# Patient Record
Sex: FEMALE | Race: OTHER | Employment: FULL TIME | URBAN - METROPOLITAN AREA
[De-identification: names, ages, dates, MRNs, and addresses within clinical notes are randomized per-mention and may not be internally consistent; named-entity substitution may affect disease eponyms.]

---

## 2017-06-20 ENCOUNTER — HOSPITAL ENCOUNTER (OUTPATIENT)
Dept: MAMMOGRAPHY | Age: 52
Discharge: HOME | End: 2017-06-20
Attending: INTERNAL MEDICINE
Payer: OTHER GOVERNMENT

## 2017-06-20 DIAGNOSIS — Z12.31 ENCOUNTER FOR SCREENING MAMMOGRAM FOR BREAST CANCER: ICD-10-CM

## 2017-06-20 PROCEDURE — 77063 BREAST TOMOSYNTHESIS BI: CPT

## 2018-07-19 ENCOUNTER — HOSPITAL ENCOUNTER (OUTPATIENT)
Dept: MAMMOGRAPHY | Age: 53
Discharge: HOME | End: 2018-07-19
Attending: INTERNAL MEDICINE
Payer: OTHER GOVERNMENT

## 2018-07-19 DIAGNOSIS — Z12.31 VISIT FOR SCREENING MAMMOGRAM: ICD-10-CM

## 2018-07-19 PROCEDURE — 77063 BREAST TOMOSYNTHESIS BI: CPT

## 2019-08-06 ENCOUNTER — HOSPITAL ENCOUNTER (OUTPATIENT)
Dept: MAMMOGRAPHY | Age: 54
Discharge: HOME | End: 2019-08-06
Attending: INTERNAL MEDICINE
Payer: OTHER GOVERNMENT

## 2019-08-06 DIAGNOSIS — Z12.31 VISIT FOR SCREENING MAMMOGRAM: ICD-10-CM

## 2019-08-06 PROCEDURE — 77063 BREAST TOMOSYNTHESIS BI: CPT

## 2019-08-12 ENCOUNTER — HOSPITAL ENCOUNTER (OUTPATIENT)
Dept: PHYSICAL THERAPY | Age: 54
Discharge: HOME | End: 2019-08-12
Payer: OTHER GOVERNMENT

## 2019-08-12 PROCEDURE — 97535 SELF CARE MNGMENT TRAINING: CPT

## 2019-08-12 PROCEDURE — 97140 MANUAL THERAPY 1/> REGIONS: CPT

## 2019-08-12 PROCEDURE — 97162 PT EVAL MOD COMPLEX 30 MIN: CPT

## 2019-08-12 NOTE — PROGRESS NOTES
[address] [place] [address], [address] [facility] - Phone: [phone]  Fax: [phone] / [address] [facility]  Patient Name: Lizzette Ring : 1965   Medical   Diagnosis: Low back pain [M54.5] Treatment Diagnosis: Low back pain [M54.5]   Onset Date:      Referral Source: Stefanie Chand MD Start of Care [facility]): 2019   Prior Hospitalization: See medical history Provider #: [id number]   Prior Level of Function: Chronic Hx of LBP   Comorbidities: Thyroid Problem   Medications: Verified on Patient Summary List   The Plan of Care and following information is based on the information from the initial evaluation.   ===========================================================================================  Assessment / key information:  Lizzette Ring is a 49 y.o.  yo female with Dx of Low back pain [M54.5]. She reports insidious onset of low back pain in . She currently rates her pain as 10/10 at worst, 2/10 at best, primarily located at L lower lumbar region. She complains of difficulty and increase pain with rotational movement. Objective Findings: Lumbar AROM: Flx = WNL, Ext = WNL, R Rot = limited by 20%, L Rot = WNL. Manual Muscle Testing:   L hip abd and ext are Reduced. Lumbar/SI screen:  L iliosacral ant innominate, L4-5 L ERS, L on R SI innominate noted. Palpation:  Decreased mobility noted at T4-6 on L.  Pt instructed in HEP and will f/u in clinic for PT.  ===========================================================================================  Eval Complexity: History MEDIUM  Complexity : 1-2 comorbidities / personal factors will impact the outcome/ POC ;  Examination  MEDIUM Complexity : 3 Standardized tests and measures addressing body structure, function, activity limitation and / or participation in recreation ; Presentation MEDIUM Complexity : Evolving with changing characteristics ; Decision Making MEDIUM Complexity : FOTO score of 26-74; Overall Complexity MEDIUM  Problem List: pain affecting function, decrease ROM, decrease strength, decrease ADL/ functional abilitiies, decrease activity tolerance and decrease flexibility/ joint mobility   Treatment Plan may include any combination of the following: Therapeutic exercise, Therapeutic activities, Neuromuscular re-education, Physical agent/modality, Manual therapy, Patient education, Self Care training and Functional mobility training  Patient / Family readiness to learn indicated by: asking questions  Persons(s) to be included in education: patient (P)  Barriers to Learning/Limitations: None  Measures taken, if barriers to learning:    Patient Goal (s): Decrease pain    Patient self reported health status: good  Rehabilitation Potential: good   Short Term Goals: To be accomplished in  1-2  weeks:  1. Independent with HEP. 2. Decrease max pain 25-50% to assist with ADLs   Long Term Goals: To be accomplished in  3-4  weeks:  1. Decrease max pain 50-75% to assist with ADLs  2. Increase FOTO score to 70% to show functional improvment. 3.  Will rate >/= +5 on Global Rating of Change and be prepared to DC to HEP. Frequency / Duration:   Patient to be seen  2-3  times per week for 3-4  weeks:  Patient / Caregiver education and instruction: self care and exercises    Therapist Signature: Tony Darling, JAIRO, OCS, SCS, CSCS Date: 8/84/7848   Certification Period: na Time: 12:24 PM   ===========================================================================================  I certify that the above Physical Therapy Services are being furnished while the patient is under my care. I agree with the treatment plan and certify that this therapy is necessary. Physician Signature:        Date:       Time:     Please sign and return to [facility] or you may fax the signed copy to [phone].   Thank you.

## 2019-08-13 NOTE — PROGRESS NOTES
PHYSICAL THERAPY - DAILY TREATMENT NOTE    Patient Name: Cecile José        Date: 2019  : 1965   YES Patient  Verified  Visit #:     Insurance: Payor: Les Blanchard / Plan: [employer] / Product Type: Commerical /      In time: 11:30 Out time: 12:10   Total Treatment Time: 40     Medicare Time Tracking (below)   Total Timed Codes (min):  na 1:1 Treatment Time:  na     TREATMENT AREA =  Chronic left-sided low back pain [M54.5, G89.29]    SUBJECTIVE  Pain Level (on 0 to 10 scale):    Medication Changes/New allergies or changes in medical history, any new surgeries or procedures? NO    If yes, update Summary List   Subjective Functional Status/Changes:  []  No changes reported     SEE IE          OBJECTIVE    12 min Manual Therapy: Shot Gun tech, L4-5 L ERS, L on R SI ayaz   Rationale:      decrease pain, increase ROM and increase tissue extensibility to improve patient's ability to perform ADLs    8 min Self Care: T/S Rot, bridges   Rationale:    increase ROM, increase strength and improve coordination to improve the patients ability to perform ADLs      Billed With/As:   [] TE   [] TA   [] Neuro   [x] Self Care Patient Education: [x] Review HEP    [] Progressed/Changed HEP based on:   [] positioning   [] body mechanics   [] transfers   [] heat/ice application    [] other:       min Patient Education:  YES  Reviewed HEP   []  Progressed/Changed HEP based on:         Other Objective/Functional Measures:    SEE IE     Post Treatment Pain Level (on 0 to 10) scale:       ASSESSMENT  Assessment/Changes in Function:     SEE IE     []  See Progress Note/Recertification   Patient will continue to benefit from skilled PT services to modify and progress therapeutic interventions, address functional mobility deficits, address ROM deficits, address strength deficits, analyze and address soft tissue restrictions, analyze and cue movement patterns, analyze and modify body mechanics/ergonomics and assess and modify postural abnormalities to attain remaining goals.    Progress toward goals / Updated goals:         PLAN  []  Upgrade activities as tolerated YES Continue plan of care   []  Discharge due to :    []  Other:      Therapist: Becka De La Fuente, PT, OCS, SCS, CSCS    Date: 8/13/2019 Time: 9:52 AM       Future Appointments   Date Time Provider Selvin Leo   8/15/2019  8:30 AM Anish Mckeon, PT [facility]   8/19/2019 10:00 AM Anish Mckeon, PT [facility]   8/22/2019  3:30 PM Rocio Byrnes PT [facility]   8/27/2019  4:00 PM Anish Mckeon, PT [facility]   8/29/2019  4:30 PM Rocio Byrnes, PT [facility]   8/11/2020  7:45 AM [facility] JUAN STEREO BX RM 1 [facility]

## 2019-08-15 ENCOUNTER — HOSPITAL ENCOUNTER (OUTPATIENT)
Dept: PHYSICAL THERAPY | Age: 54
Discharge: HOME | End: 2019-08-15
Payer: OTHER GOVERNMENT

## 2019-08-15 PROCEDURE — 97140 MANUAL THERAPY 1/> REGIONS: CPT

## 2019-08-15 PROCEDURE — 97110 THERAPEUTIC EXERCISES: CPT

## 2019-08-15 NOTE — PROGRESS NOTES
PHYSICAL THERAPY - DAILY TREATMENT NOTE    Patient Name: Govind Samples        Date: 8/15/2019  : 1965   yes Patient  Verified  Visit #:   2   of   12  Insurance: Payor: Alexander Casey / Plan: Darian Huitron / Product Type: Commerical /      In time: 8:35 Out time: 9:35   Total Treatment Time: 60     Medicare/[facility] Time Tracking (below)   Total Timed Codes (min):  na 1:1 Treatment Time:  na     TREATMENT AREA =  Chronic left-sided low back pain [M54.5, G89.29]  SUBJECTIVE  Pain Level (on 0 to 10 scale):  0  / 10   Medication Changes/New allergies or changes in medical history, any new surgeries or procedures?    no  If yes, update Summary List   Subjective Functional Status/Changes:  []  No changes reported     Felt much better after the last session          OBJECTIVE    25 min Therapeutic Exercise:  [x]  See flow sheet   Rationale:      increase ROM, increase strength and improve coordination to improve the patients ability to perform ADLs     35 min Manual Therapy: Shot Gun tech, L4-5 L ERS, t/s mob   Rationale:      decrease pain, increase ROM and increase tissue extensibility to improve patient's ability to perform ADLs     Billed With/As:   [] TE   [] TA   [] Neuro   [] Self Care Patient Education: [x] Review HEP    [] Progressed/Changed HEP based on:   [] positioning   [] body mechanics   [] transfers   [] heat/ice application    [] other:      Other Objective/Functional Measures:    Cont to be limited at mid thoracic mobility     Post Treatment Pain Level (on 0 to 10) scale:   0  / 10     ASSESSMENT  Assessment/Changes in Function:     Difficulty with DNS star on L      []  See Progress Note/Recertification   Patient will continue to benefit from skilled PT services to modify and progress therapeutic interventions, address functional mobility deficits and address ROM deficits to attain remaining goals. Progress toward goals / Updated goals:     Independent with HEP     PLAN  [x]  Upgrade activities as tolerated yes Continue plan of care   []  Discharge due to :    []  Other:      Therapist: Piyush Prince PT    Date: 8/15/2019 Time: 6:26 AM     Future Appointments   Date Time Provider Selvin Leo   8/15/2019  8:30 AM Alex Palacio, PT [facility]   8/19/2019 10:00 AM Alex Palacio, PT [facility]   8/22/2019  3:30 PM Judith Gramajo PT [facility]   8/27/2019  4:00 PM Alex Palacio, PT [facility]   8/29/2019  4:30 PM Judith Gramajo PT [facility]   8/11/2020  7:45 AM [facility] JUAN STEREO BX RM 1 [facility]

## 2019-08-19 ENCOUNTER — HOSPITAL ENCOUNTER (OUTPATIENT)
Dept: PHYSICAL THERAPY | Age: 54
Discharge: HOME | End: 2019-08-19
Payer: OTHER GOVERNMENT

## 2019-08-19 PROCEDURE — 97140 MANUAL THERAPY 1/> REGIONS: CPT

## 2019-08-19 PROCEDURE — 97110 THERAPEUTIC EXERCISES: CPT

## 2019-08-19 NOTE — PROGRESS NOTES
PHYSICAL THERAPY - DAILY TREATMENT NOTE    Patient Name: Leann Florez        Date: 2019  : 1965   yes Patient  Verified  Visit #:   3   of   12  Insurance: Payor: Geneva Khan / Plan: Daniella Hauser / Product Type: Commerical /      In time: 10:05 Out time: 11:00   Total Treatment Time: 55     Medicare/[facility] Time Tracking (below)   Total Timed Codes (min):  na 1:1 Treatment Time:  na     TREATMENT AREA =  Chronic left-sided low back pain [M54.5, G89.29]  SUBJECTIVE  Pain Level (on 0 to 10 scale):  1  / 10   Medication Changes/New allergies or changes in medical history, any new surgeries or procedures?    no  If yes, update Summary List   Subjective Functional Status/Changes:  []  No changes reported     I have been doing well.   Just a little tender at the L side of LB but Im doing much better          OBJECTIVE  25 min Therapeutic Exercise:  [x]  See flow sheet   Rationale:      increase ROM, increase strength and improve coordination to improve the patients ability to perform ADLs      30 min Manual Therapy: Shot Gun tech, L4-5 L ERS, t/s mob, DTM L psoas, L Gm, QL   Rationale:      decrease pain, increase ROM and increase tissue extensibility to improve patient's ability to perform ADLs      Billed With/As:   [] TE   [] TA   [] Neuro   [] Self Care Patient Education: [x] Review HEP    [] Progressed/Changed HEP based on:   [] positioning   [] body mechanics   [] transfers   [] heat/ice application    [] other:      Other Objective/Functional Measures:    Cont to have increased soft tissue tension at L psoas and Gm     Post Treatment Pain Level (on 0 to 10) scale:   0  / 10     ASSESSMENT  Assessment/Changes in Function:     Cont to demonstrate slight limitation in R t/s Rot     []  See Progress Note/Recertification   Patient will continue to benefit from skilled PT services to modify and progress therapeutic interventions, address functional mobility deficits and address ROM deficits to attain remaining goals.   Progress toward goals / Updated goals:    Progressing well with pain reduction     PLAN  [x]  Upgrade activities as tolerated yes Continue plan of care   []  Discharge due to :    []  Other:      Therapist: Kate Vanegas PT    Date: 8/19/2019 Time: 9:44 AM     Future Appointments   Date Time Provider Selvin Leo   8/19/2019 10:00 AM Claudia Brady, PT [facility]   8/22/2019  3:30 PM Gabriela Egan, PT [facility]   8/27/2019  4:00 PM Claudia Brady PT [facility]   8/29/2019  4:30 PM Gabriela Egan, PT [facility]   8/11/2020  7:45 AM [facility]

## 2019-08-22 ENCOUNTER — APPOINTMENT (OUTPATIENT)
Dept: PHYSICAL THERAPY | Age: 54
End: 2019-08-22
Payer: OTHER GOVERNMENT

## 2019-08-26 ENCOUNTER — HOSPITAL ENCOUNTER (OUTPATIENT)
Dept: LAB | Age: 54
Discharge: HOME | End: 2019-08-26
Payer: OTHER GOVERNMENT

## 2019-08-26 PROCEDURE — 88175 CYTOPATH C/V AUTO FLUID REDO: CPT

## 2019-08-26 PROCEDURE — 87624 HPV HI-RISK TYP POOLED RSLT: CPT

## 2019-08-27 ENCOUNTER — APPOINTMENT (OUTPATIENT)
Dept: PHYSICAL THERAPY | Age: 54
End: 2019-08-27
Payer: OTHER GOVERNMENT

## 2019-08-29 ENCOUNTER — HOSPITAL ENCOUNTER (OUTPATIENT)
Dept: PHYSICAL THERAPY | Age: 54
Discharge: HOME | End: 2019-08-29
Payer: OTHER GOVERNMENT

## 2019-08-29 PROCEDURE — 97140 MANUAL THERAPY 1/> REGIONS: CPT | Performed by: PHYSICAL THERAPIST

## 2019-08-29 PROCEDURE — 97110 THERAPEUTIC EXERCISES: CPT | Performed by: PHYSICAL THERAPIST

## 2019-08-29 NOTE — PROGRESS NOTES
[facility] PHYSICAL THERAPY - DAILY TREATMENT NOTE    Patient Name: Jessy Wiley        Date: 2019  : 1965   yes Patient  Verified  Visit #:   3   of   12  Insurance: Payor: Erik Truong / Plan: Media Bravo / Product Type: Commerical /      In time: 431 Out time: 508   Total Treatment Time: 36     Medicare/[facility] Time Tracking (below)   Total Timed Codes (min):  na 1:1 Treatment Time:  na     TREATMENT AREA =  Chronic left-sided low back pain [M54.5, G89.29]    SUBJECTIVE  Pain Level (on 0 to 10 scale):  2   10   Medication Changes/New allergies or changes in medical history, any new surgeries or procedures?    no  If yes, update Summary List   Subjective Functional Status/Changes:  []  No changes reported     I am feeling so much better since starting therapy. My pain does not spike as high or last as long when I do have it.  I am feeling really good          OBJECTIVE      20 min Therapeutic Exercise:  [x]  See flow sheet   Rationale:      increase ROM and increase strength to improve the patients ability to complete adls     16 min Manual Therapy: dtm t/s paraspinals, L GM/QL release    Rationale:      decrease pain, increase ROM, increase tissue extensibility and decrease trigger points to improve patient's ability to complete adls        Billed With/As:   [] TE   [] TA   [] Neuro   [] Self Care Patient Education: [x] Review HEP    [] Progressed/Changed HEP based on:   [] positioning   [] body mechanics   [] transfers   [] heat/ice application    [] other:      Other Objective/Functional Measures:    Completed te as per flow sheet with noted reduction in L glute max firing pattern        Post Treatment Pain Level (on 0 to 10) scale:   0  / 10     ASSESSMENT  Assessment/Changes in Function:   sij symmetrical, ls pivm asymptomatic, ttp on above mentioned te      []  See Progress Note/Recertification   Patient will continue to benefit from skilled PT services to modify and progress therapeutic interventions, address functional mobility deficits, address ROM deficits, address strength deficits, analyze and address soft tissue restrictions, analyze and cue movement patterns, analyze and modify body mechanics/ergonomics, assess and modify postural abnormalities and instruct in home and community integration to attain remaining goals.    Progress toward goals / Updated goals:    Significant progress towards pain goal      PLAN  []  Upgrade activities as tolerated yes Continue plan of care   []  Discharge due to :    []  Other:      Therapist: Osbaldo Dexter, PT    Date: 8/29/2019 Time: 4:47 PM     Future Appointments   Date Time Provider Selvin Leo   8/11/2020  7:45 AM [address] [place] [address] JUAN STEREO BX RM [address]

## 2019-09-17 ENCOUNTER — HOSPITAL ENCOUNTER (OUTPATIENT)
Dept: PHYSICAL THERAPY | Age: 54
Discharge: HOME | End: 2019-09-17
Payer: OTHER GOVERNMENT

## 2019-09-17 PROCEDURE — 97110 THERAPEUTIC EXERCISES: CPT

## 2019-09-17 PROCEDURE — 97140 MANUAL THERAPY 1/> REGIONS: CPT

## 2019-09-17 NOTE — PROGRESS NOTES
PHYSICAL THERAPY - DAILY TREATMENT NOTE    Patient Name: Brian Delgadillo        Date: 2019  : 1965   yes Patient  Verified  Visit #:      12  Insurance: Payor: BIANCA / Plan: Darren Powers 75 / Product Type:  /      In time: 5:30 Out time: 5:55   Total Treatment Time: 25     Medicare/[facility] Time Tracking (below)   Total Timed Codes (min):  na 1:1 Treatment Time:  na     TREATMENT AREA =  Chronic left-sided low back pain [M54.5, G89.29]  SUBJECTIVE  Pain Level (on 0 to 10 scale):  0  / 10   Medication Changes/New allergies or changes in medical history, any new surgeries or procedures?    no  If yes, update Summary List   Subjective Functional Status/Changes:  []  No changes reported     I havent really had pain.   I felt a bit of twinge in my LB once after the last visit, but it really wasn't pain           OBJECTIVE  10 min Therapeutic Exercise:  [x]  See flow sheet   Rationale:      increase ROM, increase strength and improve coordination to improve the patients ability to perform ADLs      15 min Manual Therapy: Shot Gun tech,  t/s mob, DTM L psoas, L Gm, QL   Rationale:      decrease pain, increase ROM and increase tissue extensibility to improve patient's ability to perform ADLs      Billed With/As:   [] TE   [] TA   [] Neuro   [] Self Care Patient Education: [x] Review HEP    [] Progressed/Changed HEP based on:   [] positioning   [] body mechanics   [] transfers   [] heat/ice application    [] other:      Other Objective/Functional Measures:    Cont to have increased soft tissue tension noted at L Gm     Post Treatment Pain Level (on 0 to 10) scale:   0  / 10     ASSESSMENT  Assessment/Changes in Function:     Independent with al Ex     []  See Progress Note/Recertification      Progress toward goals / Updated goals:    See DC     PLAN  []  Upgrade activities as tolerated  Continue plan of care   [x]  Discharge due to : Met goals   []  Other:      Therapist: Leif Cobb PT Date: 9/17/2019 Time: 2:47 PM     Future Appointments   Date Time Provider Selvin Leo   9/17/2019  5:30 PM CRISTIAN [facility]   8/11/2020  7:45 AM [facility] JUAN GARDNER BX RM 1 [facility]

## 2019-09-27 NOTE — PROGRESS NOTES
[address] [facility] [address],[place]mag[place], [address] [facility] - Phone: [phone]  Fax: [phone]  DISCHARGE SUMMARY  Patient Name: Justin Goodwin : 1965   Treatment/Medical Diagnosis: Chronic left-sided low back pain [M54.5, G89.29]   Referral Source: Andrew Cat MD     Date of Initial Visit: 19 Attended Visits: 5 Missed Visits: 0     SUMMARY OF TREATMENT  Justin Goodwin has been seen at our clinic 1-2x/wk for a total of 5 visits. Pt treatment has consisted of therapeutic exercise for thoracic ROM, hip/core strengthening, and manual therapy (jt mobilization and deep tissue mobilization)  CURRENT STATUS  Pt has had a good tolerance to physical therapy treatment. She now reports that she is nearly pain free. She continues to report a minor \"twinge\" at her low back occasionally. However, we believe that she will be able to continue to progress with her pain reduction and function independently at this point. Goal/Measure of Progress Goal Met? 1. Decrease Max pain by 50-75% to assist with ADLs   Status at last Eval: 10/10 Current Status: 1/10 yes   2. Increase FOTO score to 70 % show functional improvementt   Status at last Eval: 63% Current Status: na n/a   3. Will rate >/= +5 on Global Rating of Change and be prepared to DC to HEP. Status at last Eval: na Current Status: na n/a       RECOMMENDATIONS  Discharge from physical therapy treatment with HEP. Specifics:   If you have any questions/comments please contact us directly at [phone]. Thank you for allowing us to assist in the care of your patient.     Therapist Signature: Tawanda Lynn, DPT, OCS, SCS, CSCS Date: 2019     Time: 12:49 PM

## 2020-08-11 ENCOUNTER — HOSPITAL ENCOUNTER (OUTPATIENT)
Dept: MAMMOGRAPHY | Age: 55
Discharge: HOME | End: 2020-08-11
Payer: OTHER GOVERNMENT

## 2020-08-11 DIAGNOSIS — Z12.31 VISIT FOR SCREENING MAMMOGRAM: ICD-10-CM

## 2020-08-11 PROCEDURE — 77063 BREAST TOMOSYNTHESIS BI: CPT

## 2021-08-12 ENCOUNTER — HOSPITAL ENCOUNTER (OUTPATIENT)
Dept: WOMENS IMAGING | Age: 56
Discharge: HOME | End: 2021-08-12
Attending: OBSTETRICS & GYNECOLOGY
Payer: OTHER GOVERNMENT

## 2021-08-12 DIAGNOSIS — Z12.31 VISIT FOR SCREENING MAMMOGRAM: ICD-10-CM

## 2021-08-12 PROCEDURE — 77063 BREAST TOMOSYNTHESIS BI: CPT

## 2022-12-22 ENCOUNTER — HOSPITAL ENCOUNTER (OUTPATIENT)
Dept: LAB | Age: 57
Discharge: HOME | End: 2022-12-22
Payer: OTHER GOVERNMENT

## 2022-12-22 PROCEDURE — 87624 HPV HI-RISK TYP POOLED RSLT: CPT

## 2022-12-22 PROCEDURE — 88175 CYTOPATH C/V AUTO FLUID REDO: CPT

## 2023-01-31 DIAGNOSIS — Z12.31 ENCOUNTER FOR SCREENING MAMMOGRAM FOR BREAST CANCER: Primary | ICD-10-CM

## 2023-02-03 DIAGNOSIS — Z12.31 ENCOUNTER FOR SCREENING MAMMOGRAM FOR BREAST CANCER: Primary | ICD-10-CM

## 2023-02-06 DIAGNOSIS — Z12.31 ENCOUNTER FOR SCREENING MAMMOGRAM FOR BREAST CANCER: Primary | ICD-10-CM
